# Patient Record
Sex: MALE | Race: WHITE
[De-identification: names, ages, dates, MRNs, and addresses within clinical notes are randomized per-mention and may not be internally consistent; named-entity substitution may affect disease eponyms.]

---

## 2024-02-27 PROBLEM — Z00.00 ENCOUNTER FOR PREVENTIVE HEALTH EXAMINATION: Status: ACTIVE | Noted: 2024-02-27

## 2024-02-29 ENCOUNTER — APPOINTMENT (OUTPATIENT)
Dept: UROLOGY | Facility: CLINIC | Age: 54
End: 2024-02-29
Payer: COMMERCIAL

## 2024-02-29 VITALS
HEART RATE: 66 BPM | HEIGHT: 76 IN | BODY MASS INDEX: 23.14 KG/M2 | DIASTOLIC BLOOD PRESSURE: 69 MMHG | OXYGEN SATURATION: 98 % | SYSTOLIC BLOOD PRESSURE: 128 MMHG | WEIGHT: 190 LBS | TEMPERATURE: 98.8 F

## 2024-02-29 DIAGNOSIS — Z12.5 ENCOUNTER FOR SCREENING FOR MALIGNANT NEOPLASM OF PROSTATE: ICD-10-CM

## 2024-02-29 DIAGNOSIS — N42.9 DISORDER OF PROSTATE, UNSPECIFIED: ICD-10-CM

## 2024-02-29 DIAGNOSIS — Z80.42 FAMILY HISTORY OF MALIGNANT NEOPLASM OF PROSTATE: ICD-10-CM

## 2024-02-29 PROCEDURE — 99204 OFFICE O/P NEW MOD 45 MIN: CPT

## 2024-02-29 NOTE — HISTORY OF PRESENT ILLNESS
[FreeTextEntry1] : Name PHOENIX BUCIO MRN [id number]  Nov 10 1970 ------------------------------------------------------------------------------------------------------------------------------------------- Date of First Visit: 24 Referring Provider/PCP: self-referred / [id number] ------------------------------------------------------------------------------------------------------------------------------------------- CC: establish  care   History of Present Illness: PHOENIX BUCIO is a 53-year-old healthy male no PMH who presents to establish  care and for prostate cancer screening.  Previously seen by Dr. Jc Oliver at [facility]. No urinary issues or complaints.  Previously in the past he was having issues with urinating frequency during the day and night without was attributed to excessive urine intake (approximately 4 L daily).  Denies any sexual issues as well.  Most interested in establishing continuity of care as he has not had his PSA checked in over 2 years.  States that the numbers have always been quite low and he has never had an abnormal digital rectal exam.  He also states he has a vegetarian diet and wants to ensure he is not having issues with anemia/iron deficiency.  His father currently has prostate cancer (still living in his 80s), and likely diagnosed at some time in his 70s.  No other urological cancers in the family.  PSA History: 2021 1.00 (28% free) 2019 0.89

## 2024-02-29 NOTE — ASSESSMENT
[FreeTextEntry1] : Assessment: Healthy 53-year-old male here to establish urological care and for prostate cancer screening.  We discussed the controversy surrounding PSA testing and prostate cancer screening. We reviewed issues regarding over-screening, over-diagnosis, and overtreatment. The AUA recommends shared decision making with people for whom prostate cancer screening would be appropriate. According to the 2023 updated guidelines on early detection of prostate cancer, clinicians may begin prostate cancer screening and offer a baseline PSA test to people between ages 45 to 50 years, and should offer screening beginning at age 40-45 for people at increased risk of developing cancer based on risk factors such as black ancestry, germline mutations, and/or strong family history of prostate cancer.  This represents a paradigm change from the previously supported recommendations to begin screening at age 55, and this earlier initiation of screening is supported by observational studies demonstrating a prognostic value of obtaining a baseline PSA in early mid life.  Specifically, baseline PSA has been shown to be a stronger predictor of prostate cancer risk then race or family history of cancer. Regular screening should be performed every 2 to 4 years for men between 50 and 69 years of age.  With regards to digital rectal exam, the data to support its utility and prostate cancer screening is low.  In men with PSA less than 4 ng/mL, the risk of an abnormal OSMAN is only approximately 3%, though it is worth noting that the addition of a OSMAN has been found to improve the detection of higher-grade disease.  In contrast to screening, the use of OSMAN and a subsequent encounter may be of value and its greatest utility has been shown to be in the work-up of patient's with a PSA greater than equal to 2 ng/ml.  Plan: -PSA -CBC -Will inform patient of test results after they have resulted (he will be setting up patient portal) -Follow-up 1 year or sooner as needed

## 2024-03-02 ENCOUNTER — TRANSCRIPTION ENCOUNTER (OUTPATIENT)
Age: 54
End: 2024-03-02

## 2024-03-11 ENCOUNTER — TRANSCRIPTION ENCOUNTER (OUTPATIENT)
Age: 54
End: 2024-03-11

## 2024-03-11 LAB
BASOPHILS # BLD AUTO: 0.03 K/UL
BASOPHILS NFR BLD AUTO: 0.8 %
EOSINOPHIL # BLD AUTO: 0.22 K/UL
EOSINOPHIL NFR BLD AUTO: 5.7 %
HCT VFR BLD CALC: 42 %
HGB BLD-MCNC: 13.7 G/DL
IMM GRANULOCYTES NFR BLD AUTO: 0 %
LYMPHOCYTES # BLD AUTO: 1.34 K/UL
LYMPHOCYTES NFR BLD AUTO: 35 %
MAN DIFF?: NORMAL
MCHC RBC-ENTMCNC: 29.5 PG
MCHC RBC-ENTMCNC: 32.6 GM/DL
MCV RBC AUTO: 90.5 FL
MONOCYTES # BLD AUTO: 0.38 K/UL
MONOCYTES NFR BLD AUTO: 9.9 %
NEUTROPHILS # BLD AUTO: 1.86 K/UL
NEUTROPHILS NFR BLD AUTO: 48.6 %
PLATELET # BLD AUTO: 222 K/UL
PSA SERPL-MCNC: 1.26 NG/ML
RBC # BLD: 4.64 M/UL
RBC # FLD: 13.3 %
WBC # FLD AUTO: 3.83 K/UL